# Patient Record
Sex: FEMALE | Race: WHITE | Employment: OTHER | URBAN - NONMETROPOLITAN AREA
[De-identification: names, ages, dates, MRNs, and addresses within clinical notes are randomized per-mention and may not be internally consistent; named-entity substitution may affect disease eponyms.]

---

## 2023-02-23 ENCOUNTER — OFFICE VISIT (OUTPATIENT)
Dept: OBGYN CLINIC | Age: 63
End: 2023-02-23

## 2023-02-23 VITALS — SYSTOLIC BLOOD PRESSURE: 152 MMHG | DIASTOLIC BLOOD PRESSURE: 74 MMHG

## 2023-02-23 DIAGNOSIS — N90.89 VULVAR LESION: Primary | ICD-10-CM

## 2023-02-26 NOTE — PROGRESS NOTES
DATE OF VISIT:  2/25/23    PATIENT NAME:  Daria Zelaya     YOB: 1960    REASON FOR VISIT:    Chief Complaint   Patient presents with    New Patient     Patient refused weight/height. Patient has not been seen since 2016. Other     Patient has been using triple antibiotic ointment on a boil on left labia. It comes and goes, drains and refills. She also has some bumps on the right side in crease of leg where underwear line is at. HISTORY OF PRESENT ILLNESS:  Patient presents to establish care. She has lesions that continue to drain and refill. She is concerned about lesion below left labia - reports that it repeatedly fills and she uses warm compress and then drains. Not currently inflamed/draining but she reports that it is very painful to sit down when it is inflamed. She reports that she has also noticed a bump in right groin. Not painful or draining at this time. Has not tried warm compress on this area yet. No LMP recorded. Patient has had a hysterectomy. Vitals:    02/23/23 0858 02/23/23 0905   BP: (!) 150/78 (!) 152/74   Site: Left Upper Arm Right Upper Arm   Position: Sitting Sitting   Weight: Comment: patient refused    Height: Comment: patient refused      There is no height or weight on file to calculate BMI. No Known Allergies  Current Outpatient Medications   Medication Sig Dispense Refill    Acetaminophen (TYLENOL) 325 MG CAPS Take by mouth      IBUPROFEN PO Take by mouth      Cyanocobalamin (VITAMIN B-12 PO) Take by mouth      Biotin w/ Vitamins C & E (HAIR/SKIN/NAILS PO) Take by mouth      mupirocin (BACTROBAN) 2 % ointment Apply topically 3 times daily. 30 g 1     No current facility-administered medications for this visit.      Social History     Socioeconomic History    Marital status: Single     Spouse name: None    Number of children: None    Years of education: None    Highest education level: None   Tobacco Use    Smoking status: Every Day Packs/day: 1.00     Years: 54.00     Pack years: 54.00     Types: Cigarettes    Smokeless tobacco: Never   Vaping Use    Vaping Use: Never used   Substance and Sexual Activity    Alcohol use: Not Currently    Drug use: Yes     Types: Marijuana Kenard Snooks)     Comment: current user    Sexual activity: Not Currently       REVIEW OF SYSTEMS:  Review of Systems   Constitutional:  Negative for chills, fatigue and fever. Genitourinary:  Negative for dysuria, frequency, menstrual problem, pelvic pain, vaginal bleeding, vaginal discharge and vaginal pain. Lesion under left labia and in right groin     PHYSICAL EXAM:  BP (!) 152/74 (Site: Right Upper Arm, Position: Sitting)   Physical Exam  Constitutional:       Appearance: Normal appearance. Genitourinary:      Right Labia: lesions. Right Labia: No rash or tenderness. Left Labia: lesions. Left Labia: No tenderness or rash. HENT:      Head: Normocephalic and atraumatic. Mouth/Throat:      Mouth: Mucous membranes are moist.   Eyes:      Extraocular Movements: Extraocular movements intact. Musculoskeletal:         General: Normal range of motion. Cervical back: Normal range of motion. Neurological:      General: No focal deficit present. Mental Status: She is alert and oriented to person, place, and time. Skin:     General: Skin is warm and dry. Psychiatric:         Mood and Affect: Mood normal.         Behavior: Behavior normal.         Thought Content: Thought content normal.     The patient, Logan Gill is a 58 y.o. female, was seen with a total time spent of 30 minutes for the visit on this date of service by the E/M provider. The time component had both face to face and non face to face time spent in determining the total time component. Counseling and education regarding her diagnosis listed below and her options regarding those diagnoses were also included in determining her time component.       The patient had her preventative health maintenance recommendations and follow-up reviewed with her at the completion of her visit. ASSESSMENT:  1. Vulvar lesion        PLAN:  Recommended that she continue with warm compress. Sent mupirocin cream to start to prevent future flare-ups. No orders of the defined types were placed in this encounter. Return if symptoms worsen or fail to improve.        Electronically signed by Lee Moreno PA-C on 02/25/23